# Patient Record
Sex: MALE | Race: WHITE | NOT HISPANIC OR LATINO | Employment: FULL TIME | URBAN - METROPOLITAN AREA
[De-identification: names, ages, dates, MRNs, and addresses within clinical notes are randomized per-mention and may not be internally consistent; named-entity substitution may affect disease eponyms.]

---

## 2023-11-17 ENCOUNTER — HOSPITAL ENCOUNTER (EMERGENCY)
Facility: HOSPITAL | Age: 58
Discharge: HOME | End: 2023-11-17
Attending: EMERGENCY MEDICINE
Payer: COMMERCIAL

## 2023-11-17 ENCOUNTER — APPOINTMENT (EMERGENCY)
Dept: CT IMAGING | Facility: HOSPITAL | Age: 58
End: 2023-11-17
Payer: COMMERCIAL

## 2023-11-17 VITALS
DIASTOLIC BLOOD PRESSURE: 100 MMHG | OXYGEN SATURATION: 98 % | TEMPERATURE: 98.1 F | SYSTOLIC BLOOD PRESSURE: 162 MMHG | RESPIRATION RATE: 16 BRPM | HEART RATE: 80 BPM | HEIGHT: 67 IN | BODY MASS INDEX: 33.43 KG/M2 | WEIGHT: 213 LBS

## 2023-11-17 DIAGNOSIS — S16.1XXA STRAIN OF NECK MUSCLE, INITIAL ENCOUNTER: Primary | ICD-10-CM

## 2023-11-17 PROCEDURE — 99284 EMERGENCY DEPT VISIT MOD MDM: CPT | Performed by: PHYSICIAN ASSISTANT

## 2023-11-17 PROCEDURE — 72125 CT NECK SPINE W/O DYE: CPT

## 2023-11-17 PROCEDURE — 20552 NJX 1/MLT TRIGGER POINT 1/2: CPT | Performed by: PHYSICIAN ASSISTANT

## 2023-11-17 PROCEDURE — 99283 EMERGENCY DEPT VISIT LOW MDM: CPT

## 2023-11-17 PROCEDURE — 96372 THER/PROPH/DIAG INJ SC/IM: CPT

## 2023-11-17 RX ORDER — LIDOCAINE HYDROCHLORIDE AND EPINEPHRINE 10; 10 MG/ML; UG/ML
5 INJECTION, SOLUTION INFILTRATION; PERINEURAL ONCE
Status: COMPLETED | OUTPATIENT
Start: 2023-11-17 | End: 2023-11-17

## 2023-11-17 RX ORDER — DIAZEPAM 5 MG/1
10 TABLET ORAL ONCE
Status: COMPLETED | OUTPATIENT
Start: 2023-11-17 | End: 2023-11-17

## 2023-11-17 RX ORDER — NAPROXEN 500 MG/1
500 TABLET ORAL 2 TIMES DAILY PRN
Qty: 30 TABLET | Refills: 0 | Status: SHIPPED | OUTPATIENT
Start: 2023-11-17

## 2023-11-17 RX ORDER — BUPIVACAINE HYDROCHLORIDE 5 MG/ML
5 INJECTION, SOLUTION EPIDURAL; INTRACAUDAL ONCE
Status: COMPLETED | OUTPATIENT
Start: 2023-11-17 | End: 2023-11-17

## 2023-11-17 RX ORDER — HYDROMORPHONE HCL/PF 1 MG/ML
1 SYRINGE (ML) INJECTION ONCE
Status: COMPLETED | OUTPATIENT
Start: 2023-11-17 | End: 2023-11-17

## 2023-11-17 RX ORDER — ACETAMINOPHEN 325 MG/1
975 TABLET ORAL ONCE
Status: COMPLETED | OUTPATIENT
Start: 2023-11-17 | End: 2023-11-17

## 2023-11-17 RX ORDER — KETOROLAC TROMETHAMINE 30 MG/ML
30 INJECTION, SOLUTION INTRAMUSCULAR; INTRAVENOUS ONCE
Status: COMPLETED | OUTPATIENT
Start: 2023-11-17 | End: 2023-11-17

## 2023-11-17 RX ORDER — OXYCODONE HYDROCHLORIDE 5 MG/1
5 TABLET ORAL EVERY 6 HOURS PRN
Qty: 12 TABLET | Refills: 0 | Status: SHIPPED | OUTPATIENT
Start: 2023-11-17 | End: 2023-11-20

## 2023-11-17 RX ORDER — LIDOCAINE 50 MG/G
1 PATCH TOPICAL ONCE
Status: DISCONTINUED | OUTPATIENT
Start: 2023-11-17 | End: 2023-11-17 | Stop reason: HOSPADM

## 2023-11-17 RX ADMIN — BUPIVACAINE HYDROCHLORIDE 5 ML: 5 INJECTION, SOLUTION EPIDURAL; INTRACAUDAL; PERINEURAL at 14:45

## 2023-11-17 RX ADMIN — HYDROMORPHONE HYDROCHLORIDE 1 MG: 1 INJECTION, SOLUTION INTRAMUSCULAR; INTRAVENOUS; SUBCUTANEOUS at 13:52

## 2023-11-17 RX ADMIN — ACETAMINOPHEN 975 MG: 325 TABLET, FILM COATED ORAL at 12:08

## 2023-11-17 RX ADMIN — LIDOCAINE HYDROCHLORIDE,EPINEPHRINE BITARTRATE 5 ML: 10; .01 INJECTION, SOLUTION INFILTRATION; PERINEURAL at 14:45

## 2023-11-17 RX ADMIN — LIDOCAINE 1 PATCH: 50 PATCH CUTANEOUS at 12:08

## 2023-11-17 RX ADMIN — DIAZEPAM 10 MG: 5 TABLET ORAL at 12:08

## 2023-11-17 RX ADMIN — KETOROLAC TROMETHAMINE 30 MG: 30 INJECTION, SOLUTION INTRAMUSCULAR at 12:08

## 2023-11-17 NOTE — ED PROVIDER NOTES
History  Chief Complaint   Patient presents with    Neck Pain     Pt presents ambulatory stating "I hurt my neck on a water slide last night. My body twisted one way and my neck the other. -BT, -HS, -LOC. Pt c/o neck pain     64yo male with a history of hypertension, hyperlipidemia, type 2 diabetes, and obesity presenting with his wife for evaluation of neck pain x 1 day. Patient is from [facility] and is staying at [facility]. He went down a water slide yesterday evening around 7pm. He states his body twisted one way and his neck twisted the other. He started having pain immediately after getting off of the slide. Pain has been persistent since that time and he was having difficulty sleeping last night due to the pain. Pain is located at the right side of his neck and is worse with movement. He has tried Tylenol and Advil without relief. He believes he has whiplash. He denies any weakness or paresthesias of the extremities. History provided by:  Patient   used: No        None       Past Medical History:   Diagnosis Date    Diabetes mellitus ([address])     Hypertension        History reviewed. No pertinent surgical history. History reviewed. No pertinent family history. I have reviewed and agree with the history as documented. E-Cigarette/Vaping     E-Cigarette/Vaping Substances     Social History     Tobacco Use    Smoking status: Never    Smokeless tobacco: Never   Substance Use Topics    Alcohol use: Never    Drug use: Never       Review of Systems   Constitutional:  Negative for chills and fever. HENT:  Negative for drooling and voice change. Eyes:  Negative for discharge and redness. Respiratory:  Negative for shortness of breath and stridor. Cardiovascular:  Negative for chest pain and leg swelling. Gastrointestinal:  Negative for abdominal pain and vomiting. Musculoskeletal:  Positive for neck pain. Negative for neck stiffness.    Skin:  Negative for color change and rash. Neurological:  Negative for seizures, syncope, weakness and numbness. Psychiatric/Behavioral:  Negative for confusion. The patient is not nervous/anxious. All other systems reviewed and are negative. Physical Exam  Physical Exam  Vitals and nursing note reviewed. Constitutional:       General: He is not in acute distress. Appearance: Normal appearance. He is not toxic-appearing. HENT:      Head: Normocephalic and atraumatic. Right Ear: External ear normal.      Left Ear: External ear normal.   Eyes:      General: No scleral icterus. Right eye: No discharge. Left eye: No discharge. Conjunctiva/sclera: Conjunctivae normal.   Neck:      Vascular: No carotid bruit. Comments: No tenderness to palpation of the cervical spine. +Pain to the R neck with ROM. No skin changes. No carotid bruits. Cardiovascular:      Rate and Rhythm: Normal rate. Pulses:           Radial pulses are 2+ on the right side and 2+ on the left side. Pulmonary:      Effort: Pulmonary effort is normal. No respiratory distress. Breath sounds: No stridor. Musculoskeletal:         General: No deformity. Cervical back: No edema, erythema, tenderness or crepitus. Pain with movement present. No spinous process tenderness. Decreased range of motion. Skin:     General: Skin is warm and dry. Neurological:      General: No focal deficit present. Mental Status: He is alert. Mental status is at baseline.    Psychiatric:         Mood and Affect: Mood normal.         Behavior: Behavior normal.         Vital Signs  ED Triage Vitals [11/17/23 1123]   Temperature Pulse Respirations Blood Pressure SpO2   98.1 °F (36.7 °C) 80 16 162/100 98 %      Temp Source Heart Rate Source Patient Position - Orthostatic VS BP Location FiO2 (%)   Oral Monitor Sitting Left arm --      Pain Score       9           Vitals:    11/17/23 1123   BP: 162/100   Pulse: 80   Patient Position - Orthostatic VS: Sitting         Visual Acuity      ED Medications  Medications   ketorolac (TORADOL) injection 30 mg (30 mg Intramuscular Given 11/17/23 1208)   acetaminophen (TYLENOL) tablet 975 mg (975 mg Oral Given 11/17/23 1208)   diazepam (VALIUM) tablet 10 mg (10 mg Oral Given 11/17/23 1208)   HYDROmorphone (DILAUDID) injection 1 mg (1 mg Intramuscular Given 11/17/23 1352)   lidocaine-epinephrine (XYLOCAINE/EPINEPHRINE) 1 %-1:100,000 injection 5 mL (5 mL Infiltration Given 11/17/23 1445)   bupivacaine (PF) (MARCAINE) 0.5 % injection 5 mL (5 mL Infiltration Given 11/17/23 1445)       Diagnostic Studies  Results Reviewed       None                   CT spine cervical without contrast   Final Result by DAPHNEY Maria MD (11/17 1447)      No cervical spine fracture or traumatic malalignment. Workstation performed: [id number]                    Procedures  Trigger Injection 1 or 2 muscles    Date/Time: 11/17/2023 6:45 PM    Performed by: Ninoska Knutson PA-C  Authorized by: Ninoska Knutson PA-C    Patient location:  ED  Other Assisting Provider: No    Consent:     Consent obtained:  Verbal    Consent given by:  Patient    Alternatives discussed:  No treatment  Universal protocol:     Procedure explained and questions answered to patient or proxy's satisfaction: yes      Patient identity confirmed:  Verbally with patient  Location:     Nerve block body site: Neck. Injection Trigger Points:  3  Pre-procedure details:     Procedure prep: Alcohol. Procedure details:     Needle gauge:  25 G    Anesthetic injected:  Bupivacaine 0.5% w/o epi and lidocaine 1% WITH epi    Steroid injected:  None    Additive injected:  None    Injection procedure:  Anatomic landmarks identified, incremental injection, negative aspiration for blood and anatomic landmarks palpated  Post-procedure details:     Dressing:  None    Outcome:  Pain improved    Patient tolerance of procedure:   Tolerated well, no immediate complications           ED Course                   SBIRT 20yo+      Flowsheet Row Most Recent Value   Initial Alcohol Screen: US AUDIT-C     1. How often do you have a drink containing alcohol? 0 Filed at: 11/17/2023 1122   2. How many drinks containing alcohol do you have on a typical day you are drinking? 0 Filed at: 11/17/2023 1122   3a. Male UNDER 65: How often do you have five or more drinks on one occasion? 0 Filed at: 11/17/2023 1122   Audit-C Score 0 Filed at: 11/17/2023 1122   ARTUR: How many times in the past year have you. .. Used an illegal drug or used a prescription medication for non-medical reasons? Never Filed at: 11/17/2023 1122                      Medical Decision Making  55yoM presenting for neck pain x 1 day that started after going down a water slide. C/o R sided pain with movement. No weakness or paresthesias in the extremities. He is well appearing with normal vitals. There is no tenderness to palpation of the neck but there is pain with ROM. CT cervical spine obtained which is negative for fracture and traumatic malalignment. He was given IM Toradol, Dilaudid, Tylenol, Valium, and lidocaine patch with minimal improvement. Trigger point injections performed. He has mild improvement but still has pain primarily with right neck flexion. Presentation consistent with a cervical strain. Will provide prescriptions for naproxen and oxycodone. Advised close PCP follow-up. ED return precautions discussed. Patient expressed understanding and is agreeable to plan. Patient discharged in stable condition. Problems Addressed:  Strain of neck muscle, initial encounter: acute illness or injury    Amount and/or Complexity of Data Reviewed  Radiology: ordered. Risk  OTC drugs. Prescription drug management.              Disposition  Final diagnoses:   Strain of neck muscle, initial encounter     Time reflects when diagnosis was documented in both MDM as applicable and the Disposition within this note       Time User Action Codes Description Comment    11/17/2023  3:01 PM 1019 Sharif Gannon, [address] [id number]. 1XXA] Strain of neck muscle, initial encounter           ED Disposition       ED Disposition   Discharge    Condition   Stable    Date/Time   Fri Nov 17, 2023 [place] discharge to home/self care. Follow-up Information       Follow up With Specialties Details Why Contact Info Additional Information    your family doctor  Schedule an appointment as soon as possible for a visit        [address] [place] [address] Emergency Department Emergency Medicine  If symptoms worsen [address] Emergency Department, [place], [place], [address]            Discharge Medication List as of 11/17/2023  3:03 PM        START taking these medications    Details   naproxen (Naprosyn) 500 mg tablet Take 1 tablet (500 mg total) by mouth 2 (two) times a day as needed for mild pain or moderate pain, Starting Fri 11/17/2023, Normal      oxyCODONE (ROXICODONE) 5 immediate release tablet Take 1 tablet (5 mg total) by mouth every 6 (six) hours as needed for severe pain for up to 3 days Max Daily Amount: 20 mg, Starting Fri 11/17/2023, Until Mon 11/20/2023 at 2359, Normal             No discharge procedures on file.     PDMP Review         Value Time User    PDMP Reviewed  Yes 11/17/2023  3:02 PM Zaki Stockton PA-C            ED Provider  Electronically Signed by             Zaki Stockton PA-C  11/17/23 0393

## 2023-11-17 NOTE — DISCHARGE INSTRUCTIONS
Apply heat/ice to affected area, whichever feels better. Take naproxen as prescribed. You may take Tylenol 650mg every 6 hours as needed for pain. Take oxycodone as needed for severe breakthrough pain. Please follow-up with your family doctor. Return to the ER with any worsening symptoms.